# Patient Record
(demographics unavailable — no encounter records)

---

## 2024-12-09 NOTE — HISTORY OF PRESENT ILLNESS
[Other: _____] : [unfilled] [FreeTextEntry1] : Dm, HLD, HTN, Af, GERD, UE weaknes [de-identified] : 90 y/o female hx Dm, HLD, HTN, Af, GERD, UE weakness here to establish care with new PCP and for f/u. on diltiazem, Eliquis, Ativan,. metformin, lipitor, pepcid. Was following with Dr Liu and then Dr Valerio. Saw cardiology.  Stable.  Stress and echo okay. Had seen ENT for sore throat - dx'd with GERD.  Still with some throat sx and phlegm.   no relief with the H2 blocker.   she has been following with psych for insomnia.  Has been taking ativan.  Asking to get sleep testing.  Has seen pulm.  Reports that she had sleep study - with riddhi.  she has noticed no difference with cpap.  Ears have been okay.  No complication from having the holes repaired.   no other c/o.

## 2024-12-09 NOTE — PHYSICAL EXAM
[No Acute Distress] : no acute distress [Well Nourished] : well nourished [Well Developed] : well developed [Well-Appearing] : well-appearing [Normal Sclera/Conjunctiva] : normal sclera/conjunctiva [PERRL] : pupils equal round and reactive to light [EOMI] : extraocular movements intact [Normal Oropharynx] : the oropharynx was normal [No JVD] : no jugular venous distention [No Lymphadenopathy] : no lymphadenopathy [Supple] : supple [Thyroid Normal, No Nodules] : the thyroid was normal and there were no nodules present [No Respiratory Distress] : no respiratory distress  [No Accessory Muscle Use] : no accessory muscle use [Clear to Auscultation] : lungs were clear to auscultation bilaterally [Normal Rate] : normal rate  [Regular Rhythm] : with a regular rhythm [Normal S1, S2] : normal S1 and S2 [No Murmur] : no murmur heard [No Carotid Bruits] : no carotid bruits [No Abdominal Bruit] : a ~M bruit was not heard ~T in the abdomen [Pedal Pulses Present] : the pedal pulses are present [No Edema] : there was no peripheral edema [No Palpable Aorta] : no palpable aorta [Soft] : abdomen soft [Non Tender] : non-tender [Non-distended] : non-distended [No Masses] : no abdominal mass palpated [No HSM] : no HSM [Normal Bowel Sounds] : normal bowel sounds [Normal Posterior Cervical Nodes] : no posterior cervical lymphadenopathy [Normal Anterior Cervical Nodes] : no anterior cervical lymphadenopathy [No CVA Tenderness] : no CVA  tenderness [No Spinal Tenderness] : no spinal tenderness [No Joint Swelling] : no joint swelling [Grossly Normal Strength/Tone] : grossly normal strength/tone [No Rash] : no rash [Coordination Grossly Intact] : coordination grossly intact [No Focal Deficits] : no focal deficits [Normal Gait] : normal gait [Speech Grossly Normal] : speech grossly normal [Memory Grossly Normal] : memory grossly normal [Normal Affect] : the affect was normal [Alert and Oriented x3] : oriented to person, place, and time [Normal Mood] : the mood was normal [Normal Insight/Judgement] : insight and judgment were intact [Normal Outer Ear/Nose] : the outer ears and nose were normal in appearance

## 2024-12-09 NOTE — HEALTH RISK ASSESSMENT
[No] : In the past 12 months have you used drugs other than those required for medical reasons? No [0] : 2) Feeling down, depressed, or hopeless: Not at all (0) [PHQ-2 Negative - No further assessment needed] : PHQ-2 Negative - No further assessment needed [Never] : Never [Audit-CScore] : 0 [[id number]] : 0

## 2024-12-09 NOTE — HISTORY OF PRESENT ILLNESS
[Other: _____] : [unfilled] [FreeTextEntry1] : Dm, HLD, HTN, Af, GERD, UE weaknes [de-identified] : 88 y/o female hx Dm, HLD, HTN, Af, GERD, UE weakness here to establish care with new PCP and for f/u. on diltiazem, Eliquis, Ativan,. metformin, lipitor, pepcid. Was following with Dr Liu and then Dr Valerio. Saw cardiology.  Stable.  Stress and echo okay. Had seen ENT for sore throat - dx'd with GERD.  Still with some throat sx and phlegm.   no relief with the H2 blocker.   she has been following with psych for insomnia.  Has been taking ativan.  Asking to get sleep testing.  Has seen pulm.  Reports that she had sleep study - with riddhi.  she has noticed no difference with cpap.  Ears have been okay.  No complication from having the holes repaired.   no other c/o.

## 2024-12-09 NOTE — HEALTH RISK ASSESSMENT
[No] : In the past 12 months have you used drugs other than those required for medical reasons? No [0] : 2) Feeling down, depressed, or hopeless: Not at all (0) [PHQ-2 Negative - No further assessment needed] : PHQ-2 Negative - No further assessment needed [Never] : Never [Audit-CScore] : 0 [[id number]] : 0

## 2024-12-09 NOTE — REVIEW OF SYSTEMS
[Sore Throat] : sore throat [Muscle Weakness] : muscle weakness [Insomnia] : insomnia [Negative] : Heme/Lymph [Joint Pain] : no joint pain [Joint Stiffness] : no joint stiffness [Joint Swelling] : no joint swelling [Muscle Pain] : no muscle pain [Back Pain] : no back pain [Depression] : no depression [FreeTextEntry4] : phlegm [FreeTextEntry9] : UE weakness

## 2024-12-09 NOTE — INTERPRETER SERVICES
[Ad Hoc ] : provided by an ad hoc  [Interpreters_Relationshiptopatient] : son [FreeTextEntry3] : Son helping b/c pt's english is not good.

## 2025-03-10 NOTE — PHYSICAL EXAM
[No Acute Distress] : no acute distress [Well Nourished] : well nourished [Well Developed] : well developed [Well-Appearing] : well-appearing [Normal Sclera/Conjunctiva] : normal sclera/conjunctiva [PERRL] : pupils equal round and reactive to light [EOMI] : extraocular movements intact [Normal Outer Ear/Nose] : the outer ears and nose were normal in appearance [Normal Oropharynx] : the oropharynx was normal [No JVD] : no jugular venous distention [No Lymphadenopathy] : no lymphadenopathy [Supple] : supple [Thyroid Normal, No Nodules] : the thyroid was normal and there were no nodules present [No Respiratory Distress] : no respiratory distress  [No Accessory Muscle Use] : no accessory muscle use [Clear to Auscultation] : lungs were clear to auscultation bilaterally [Normal Rate] : normal rate  [Regular Rhythm] : with a regular rhythm [Normal S1, S2] : normal S1 and S2 [No Murmur] : no murmur heard [No Carotid Bruits] : no carotid bruits [No Abdominal Bruit] : a ~M bruit was not heard ~T in the abdomen [Pedal Pulses Present] : the pedal pulses are present [No Edema] : there was no peripheral edema [No Palpable Aorta] : no palpable aorta [Soft] : abdomen soft [Non Tender] : non-tender [Non-distended] : non-distended [No Masses] : no abdominal mass palpated [No HSM] : no HSM [Normal Bowel Sounds] : normal bowel sounds [Normal Posterior Cervical Nodes] : no posterior cervical lymphadenopathy [Normal Anterior Cervical Nodes] : no anterior cervical lymphadenopathy [No CVA Tenderness] : no CVA  tenderness [No Spinal Tenderness] : no spinal tenderness [No Joint Swelling] : no joint swelling [Grossly Normal Strength/Tone] : grossly normal strength/tone [Coordination Grossly Intact] : coordination grossly intact [No Focal Deficits] : no focal deficits [Normal Gait] : normal gait [Speech Grossly Normal] : speech grossly normal [Memory Grossly Normal] : memory grossly normal [Normal Affect] : the affect was normal [Alert and Oriented x3] : oriented to person, place, and time [Normal Mood] : the mood was normal [Normal Insight/Judgement] : insight and judgment were intact

## 2025-03-10 NOTE — HEALTH RISK ASSESSMENT
[No] : In the past 12 months have you used drugs other than those required for medical reasons? No [0] : 2) Feeling down, depressed, or hopeless: Not at all (0) [PHQ-2 Negative - No further assessment needed] : PHQ-2 Negative - No further assessment needed [Never] : Never [Audit-CScore] : 0 [[id number]] : 0

## 2025-03-10 NOTE — HISTORY OF PRESENT ILLNESS
[Other: _____] : [unfilled] [FreeTextEntry1] : Dm, HLD, HTN, Af, GERD, UE weaknes [de-identified] : 90 y/o female hx Dm, HLD, HTN, Af, GERD, UE weakness here for f/u on diltiazem, Eliquis, Ativan, metformin, lipitor, pepcid. Was following with Dr Liu and then Dr Valerio. Saw cardiology.  Stable.  Stress and echo okay. Had seen ENT for sore throat - dx'd with GERD.  Still with some throat sx and phlegm.   no relief with the H2 blocker.  Has tried rx for congestion/pnd.  Has tried allergy rx.   she has been following with psych for insomnia.  Has been taking ativan.  Asking to get sleep testing.  Has seen pulm.  Reports that she had sleep study - with riddhi.  she has noticed no difference with cpap.  Ears have been okay.  No complication from having the holes repaired.   no other c/o.  Pneumo - to check re: last.   flu -gets

## 2025-03-10 NOTE — COUNSELING
SUBJECTIVE:  Masoud Kohler is a 72 year old male who presents for follow up in regards to BPH with LUTS. He is s/p TURP 9/15/20. He had 36 grams removed. He now presents for follow up.      He has some weak stream and some intermittency and frequency and urgency. Mild straining. Noct x 2. His IPSS is now 19. QOL 2.     No GH. No Dysuria.     He is on a diuretic. He can hold the urine - no urgency. His cardiologist approved viagra. No nitro.   Would like to try viagra for erections.      He is taking flomax 0.4 mg daily.     His PSA is 5.11.  Normal for his age is 4.9 or less.  PSA is risen from 3.80 in 2019.    He did have some severe nose bleeds - was hospitalized with this - hemorrhaging blood. 2 episodes - in April - required 4 u PRBCs.  He is on blood thinners. He has worsening A fib issues as well. He is on O2 as well. He prefers to hold off on any evaluation of the PSA at this time.     Patient Active Problem List   Diagnosis   • AF (paroxysmal atrial fibrillation) (CMD)   • Chronic diastolic heart failure (CMD)   • COPD (chronic obstructive pulmonary disease) (CMD)   • Hyperlipidemia   • Hypertension, benign   • LISETH (obstructive sleep apnea)   • TIA (transient ischemic attack)   • PCO (posterior capsular opacification), bilateral   • Retinal hemorrhage of right eye       Current Outpatient Medications   Medication Sig Dispense Refill   • ALPRAZolam (XANAX) 0.5 MG tablet Take 1 tablet by mouth 3 times daily as needed.     • tamsulosin (FLOMAX) 0.4 MG Cap Take 1 capsule by mouth daily after a meal. Nightly 90 capsule 3   • sildenafil (Viagra) 100 MG tablet Take 1 tablet by mouth as needed for Erectile Dysfunction. 24 tablet 3   • Cyanocobalamin (Vitamin B-12) 2500 MCG SL Tab      • albuterol 108 (90 Base) MCG/ACT inhaler INHALE 2 PUFFS PO Q 4 HOURS PRF SOB     • ipratropium-albuterol (DUONEB) 0.5-2.5 (3) MG/3ML nebulizer solution Take 3 mLs by nebulization 2 times daily.     • budesonide (PULMICORT) 0.5  [None] : None MG/2ML nebulizer suspension Take 0.5 mg by nebulization daily.     • roflumilast (DALIRESP) 500 MCG Tab Take 500 mcg by mouth.     • Cetirizine HCl 10 MG Cap Take 10 mg by mouth.     • POTASSIUM GLUCONATE PO Take 275 mg by mouth daily.     • Ascorbic Acid (VITAMIN C) 1000 MG tablet Take 1,000 mg by mouth daily.     • montelukast (SINGULAIR) 10 MG tablet Take 10 mg by mouth nightly.     • atorvastatin (LIPITOR) 20 MG tablet Take 20 mg by mouth daily.     • bisoprolol-hydrochlorothiazide (ZIAC) 10-6.25 MG per tablet Take 1 Tab by mouth daily.     • dilTIAZem (CARDIZEM CD) 240 MG 24 hr capsule      • ferrous sulfate 325 (65 FE) MG tablet Take 325 mg by mouth.     • furosemide (LASIX) 20 MG tablet Take 20 mg by mouth 2 (two) times daily.     • Multiple Vitamins-Minerals (MULTI-VITAMIN/MINERALS PO) Take  by mouth.     • OXYGEN-HELIUM IN Inhale  by mouth.     • rivaroxaban (XARELTO) 20 MG Tab Take 20 mg by mouth.     • tiotropium-olodaterol (STIOLTO RESPIMAT) 2.5-2.5 MCG/ACT inhaler INHALE 2 PUFFS BY MOUTH DAILY     • Unable to Find Medication OXYGEN PERMEABLE LENS PRODUCTS       No current facility-administered medications for this visit.       ALLERGIES:  No Known Allergies    Social History     Socioeconomic History   • Marital status: /Civil Union     Spouse name: Not on file   • Number of children: Not on file   • Years of education: Not on file   • Highest education level: Not on file   Occupational History   • Not on file   Tobacco Use   • Smoking status: Former     Current packs/day: 0.00     Types: Cigarettes     Quit date: 2010     Years since quittin.7   • Smokeless tobacco: Never   Vaping Use   • Vaping Use: never used   Substance and Sexual Activity   • Alcohol use: Yes     Comment: occ   • Drug use: Not on file   • Sexual activity: Not on file   Other Topics Concern   • Not on file   Social History Narrative   • Not on file     Social Determinants of Health     Financial Resource Strain: Not  [Good understanding] : Patient has a good understanding of lifestyle changes and steps needed to achieve self management goal on file   Food Insecurity: Not on file   Transportation Needs: Not on file   Physical Activity: Not on file   Stress: Not on file   Social Connections: Not on file   Intimate Partner Violence: Not on file     Social History     Tobacco Use   Smoking Status Former   • Current packs/day: 0.00   • Types: Cigarettes   • Quit date: 2010   • Years since quittin.7   Smokeless Tobacco Never       ROS:  Constitutional: Denies fevers or weight changes  Cardiovascular: Denies chest pain or SOB  GI: Denies nausea, vomiting, diarrhea or constipation  All remaining ROS were negative in a 10-point survey except for those listed in the HPI.    OBJECTIVE:   Physical Exam:   Visit Vitals  Temp 97.3 °F (36.3 °C) (Temporal)   Ht 6' 2\" (1.88 m)   Wt 87.1 kg (192 lb)   BMI 24.65 kg/m²     General: Well developed, well nourished male  Psych: Alert and oriented to person, place and time.  Skin: Normal Color/tone. Without obvious lesions.  Lungs: No laboring or audible wheezes  Abdomen: Soft, non-tender, non-distended. No Organomegaly. No CVAT  SRINIVASAN: 35gm no nodules, no palp SVs.     Post-void Residual (performed today in clinic): 0 ml    Laboratory:    Component PSA, TOTAL (SCREENING) PSA, TOTAL   Latest Ref Rng & Units 0.00 - 4.90 ng/mL < OR = 4.00 ng/mL   2018 3.03    2019  3.8   3/30/2022  4.43 (H)   2023       Component PSA, TOTAL PSA, Total   Latest Ref Rng & Units < OR = 4.00 ng/mL < OR = 4.00 ng/mL   3/30/2022 4.43 (H)    2023  5.63 (H)   5/10/2023 5.11 (H)      PSA - 3/23/21 - 3.1    Most recent UA:  No results found for: APPEARANCE, COLOR, KETONES, WBCCAST, RBCCAST, BACT, YEAST, HYALINECAST    No components found for: CREAT    Lab Results   Component Value Date    MCV 87.9 2023        ASSESSMENT:    · BPH with LUTS - s/p TURP - doing well - some worsening symptoms - increase flomax.   · ED - on viagra in the past - would like to start this again  · Elevated PSA - 5.11.     PLAN:   Resume flomax  0.4 mg daily.   Side affects associated with this type of medication include: orthostatic hypotension, headache, dizziness, retrograde ejaculation, upset stomach, blurred vision or allergic reaction.   Continue Viagra 100 mg for erection.  (do not take within 4 hours of the flomax [tamsulosin])  Potential side effects of this medication may including headache, flushing, upset stomach, back/muscle pain, nasal congestion, vision changes and priapism.    We discussed the proper use of the medication, timing with food, rate of onset of action, possible side effects and drug interactions, especially potentially fatal reaction with NTG.   Patient advised to take medication on an empty stomach 1 hour prior to intercourse. He was informed that stimulation is required to achieve erection.  Follow up in 6 months with PSA.   You have an elevated PSA. This could potentially be secondary to cancer in the prostate. The only way to diagnose cancer in the prostate is to perform a prostate biopsy.   We discussed the risks and benefits of this procedure in detail. The risks include, but are not limited to, bleeding, hematuria, clot retention, hematospermia, rectal bleeding, infection/sepsis, pain, and a 4% risk of hospitalization.   You currently would like to hold off on proceeding with a prostate biopsy. This could potentially delay a cancer diagnosis and may result in inability to cure a potential cancer in the future.   Follow up in 6 months      Patient verbalized understanding.     Electronically signed by: Brock Ragland DO  6/6/2023

## 2025-03-10 NOTE — INTERPRETER SERVICES
[Ad Hoc ] : provided by an ad hoc  [FreeTextEntry3] : helping with translation for the things that the pt does not understand [Interpreters_Relationshiptopatient] : son